# Patient Record
Sex: FEMALE | Race: WHITE | HISPANIC OR LATINO | URBAN - METROPOLITAN AREA
[De-identification: names, ages, dates, MRNs, and addresses within clinical notes are randomized per-mention and may not be internally consistent; named-entity substitution may affect disease eponyms.]

---

## 2024-04-04 ENCOUNTER — OV NP (OUTPATIENT)
Dept: URBAN - METROPOLITAN AREA CLINIC 7 | Facility: CLINIC | Age: 54
End: 2024-04-04

## 2024-04-25 ENCOUNTER — LAB (OUTPATIENT)
Dept: URBAN - METROPOLITAN AREA SURGERY CENTER 5 | Facility: SURGERY CENTER | Age: 54
End: 2024-04-25

## 2024-04-29 ENCOUNTER — DAC (OUTPATIENT)
Dept: URBAN - METROPOLITAN AREA SURGERY CENTER 5 | Facility: SURGERY CENTER | Age: 54
End: 2024-04-29
Payer: COMMERCIAL

## 2024-04-29 DIAGNOSIS — Z12.11 ENCOUNTER FOR SCREENING FOR MALIGNANT NEOPLASM OF COLON: ICD-10-CM

## 2024-04-29 DIAGNOSIS — K64.8 OTHER HEMORRHOIDS: ICD-10-CM

## 2024-04-29 DIAGNOSIS — K63.5 POLYP OF ASCENDING COLON, UNSPECIFIED TYPE: ICD-10-CM

## 2024-04-29 DIAGNOSIS — K63.89 OTHER SPECIFIED DISEASES OF INTESTINE: ICD-10-CM

## 2024-04-29 PROCEDURE — 45385 COLONOSCOPY W/LESION REMOVAL: CPT | Performed by: INTERNAL MEDICINE

## 2025-01-23 ENCOUNTER — DASHBOARD ENCOUNTERS (OUTPATIENT)
Age: 55
End: 2025-01-23

## 2025-01-23 ENCOUNTER — OFFICE VISIT (OUTPATIENT)
Dept: URBAN - METROPOLITAN AREA CLINIC 7 | Facility: CLINIC | Age: 55
End: 2025-01-23
Payer: COMMERCIAL

## 2025-01-23 VITALS
BODY MASS INDEX: 25.71 KG/M2 | RESPIRATION RATE: 16 BRPM | HEIGHT: 66 IN | HEART RATE: 71 BPM | SYSTOLIC BLOOD PRESSURE: 120 MMHG | DIASTOLIC BLOOD PRESSURE: 70 MMHG | TEMPERATURE: 97.6 F | WEIGHT: 160 LBS

## 2025-01-23 DIAGNOSIS — Z86.0100 PERSONAL HISTORY OF COLON POLYPS, UNSPECIFIED: ICD-10-CM

## 2025-01-23 DIAGNOSIS — R10.2 PELVIC PAIN IN FEMALE: ICD-10-CM

## 2025-01-23 DIAGNOSIS — K59.04 CHRONIC IDIOPATHIC CONSTIPATION: ICD-10-CM

## 2025-01-23 DIAGNOSIS — Z90.710 H/O HYSTERECTOMY FOR BENIGN DISEASE: ICD-10-CM

## 2025-01-23 PROCEDURE — 99214 OFFICE O/P EST MOD 30 MIN: CPT | Performed by: INTERNAL MEDICINE

## 2025-01-23 RX ORDER — MELOXICAM 15 MG/1
TABLET ORAL
Qty: 30 TABLET | Status: DISCONTINUED | COMMUNITY

## 2025-01-23 RX ORDER — LUBIPROSTONE 8 UG/1
1 CAPSULE WITH FOOD AND WATER CAPSULE, GELATIN COATED ORAL TWICE A DAY
Qty: 60 | Refills: 11 | OUTPATIENT
Start: 2025-01-23 | End: 2026-01-18

## 2025-01-23 RX ORDER — CYCLOBENZAPRINE 10 MG/1
TABLET, FILM COATED ORAL
Qty: 30 TABLET | Status: DISCONTINUED | COMMUNITY

## 2025-01-23 RX ORDER — ETODOLAC 400 MG/1
TABLET, FILM COATED ORAL
Qty: 60 TABLET | Status: DISCONTINUED | COMMUNITY

## 2025-01-23 NOTE — HPI-TODAY'S VISIT:
LV 4/2024 was for screening colon. Colon 4/2024 with 1 small TA, melanosis, tics, int hems. FU now to re-establish care. She is having constipation issues, long-term issues. Causes her to have back pain. Has herniated discs. Has taken everything under the sun for laxatives. Was on benefiber/miralax, didnt work. Takes stimulant every 3 weeks, but otherwise has BM every 3-4 days, pebbly, hard. Low abd pain at times.

## 2025-05-28 ENCOUNTER — TELEPHONE ENCOUNTER (OUTPATIENT)
Dept: URBAN - METROPOLITAN AREA CLINIC 7 | Facility: CLINIC | Age: 55
End: 2025-05-28

## 2025-05-29 ENCOUNTER — OFFICE VISIT (OUTPATIENT)
Dept: URBAN - METROPOLITAN AREA CLINIC 7 | Facility: CLINIC | Age: 55
End: 2025-05-29

## 2025-05-29 RX ORDER — LUBIPROSTONE 8 UG/1
1 CAPSULE WITH FOOD AND WATER CAPSULE, GELATIN COATED ORAL TWICE A DAY
Qty: 60 | Refills: 11 | Status: ACTIVE | COMMUNITY
Start: 2025-01-23 | End: 2026-01-18

## 2025-05-29 NOTE — HPI-TODAY'S VISIT:
LV 1/2025. Colon 4/2024 with 1 small TA, melanosis, tics, int hems. She was having constipation issues, long-term issues. Causes her to have back pain. Has herniated discs. Has taken everything under the sun as far as laxatives. Was on benefiber/miralax, didnt work. Takes stimulant every 3 weeks, but otherwise has BM every 3-4 days, pebbly, hard. Low abd pain at times. Plan was more consistent bowel regimen with fiber, miralax, and add lubiprostone 8 mcg twice daily, and may need to titrate upwards. No alarm symptoms. No need for further testing at that point. FU now. Labs in 2025 with normal CBC, CMP, and recent CT urogram with IV contrast without any intra-abdominal pathology.

## 2025-06-23 ENCOUNTER — OFFICE VISIT (OUTPATIENT)
Dept: URBAN - METROPOLITAN AREA CLINIC 7 | Facility: CLINIC | Age: 55
End: 2025-06-23
Payer: COMMERCIAL

## 2025-06-23 DIAGNOSIS — Z90.710 H/O HYSTERECTOMY FOR BENIGN DISEASE: ICD-10-CM

## 2025-06-23 DIAGNOSIS — K21.9 GASTROESOPHAGEAL REFLUX DISEASE, UNSPECIFIED WHETHER ESOPHAGITIS PRESENT: ICD-10-CM

## 2025-06-23 DIAGNOSIS — R14.0 BLOATING: ICD-10-CM

## 2025-06-23 DIAGNOSIS — K59.04 CHRONIC IDIOPATHIC CONSTIPATION: ICD-10-CM

## 2025-06-23 DIAGNOSIS — R10.2 PELVIC PAIN IN FEMALE: ICD-10-CM

## 2025-06-23 DIAGNOSIS — Z86.0100 PERSONAL HISTORY OF COLON POLYPS, UNSPECIFIED: ICD-10-CM

## 2025-06-23 DIAGNOSIS — R11.0 NAUSEA: ICD-10-CM

## 2025-06-23 PROBLEM — 235595009: Status: ACTIVE | Noted: 2025-06-23

## 2025-06-23 PROCEDURE — 99214 OFFICE O/P EST MOD 30 MIN: CPT

## 2025-06-23 RX ORDER — LUBIPROSTONE 8 UG/1
1 CAPSULE WITH FOOD AND WATER CAPSULE, GELATIN COATED ORAL TWICE A DAY
Qty: 60 | Refills: 11 | Status: DISCONTINUED | COMMUNITY
Start: 2025-01-23 | End: 2026-01-18

## 2025-06-23 RX ORDER — LUBIPROSTONE 24 UG/1
1 CAPSULE WITH FOOD AND WATER CAPSULE, GELATIN COATED ORAL TWICE A DAY
Qty: 60 | OUTPATIENT
Start: 2025-06-23 | End: 2025-07-23

## 2025-06-23 NOTE — HPI-TODAY'S VISIT:
Patient is a 54-year-old female presents for follow-up of chronic constipation. She reports ongoing symptoms despite previous trial of lubiprostone, which she used for 2 weeks but discontinued due to worsening of constipation and lack of benefit. She continues to experience a sense of incomplete evacuation, bloating, and heaviness, along with subjective weight gain. Uses OTC laxatives intermittently for relief. Denies rectal bleeding. Also reports associated upper GI symptoms including nausea, epigastric discomfort, acid reflux, frequent burping, and indigestion over the past few months. Symptoms are exacerbated by orange juice. She takes Pepcid as needed, which provides partial relief. Denies early satiety, dysphagia, odynophagia, or unexplained weight loss.  LV 1/2025 by Dr. Beasley. Colon 4/2024 with 1 small TA, melanosis, tics, int hems. She was having constipation issues, long-term issues. Causes her to have back pain. Has herniated discs. Has taken everything under the sun as far as laxatives. Was on benefiber/miralax, didnt work. Takes stimulant every 3 weeks, but otherwise has BM every 3-4 days, pebbly, hard. Low abd pain at times. Plan was more consistent bowel regimen with fiber, miralax, and add lubiprostone 8 mcg twice daily, and may need to titrate upwards. No alarm symptoms. No need for further testing at that point. Outside labs in 5/2025 with normal CBC, CMP, and recent CT urogram with IV contrast without any intra-abdominal pathology. FU now.

## 2025-07-07 ENCOUNTER — TELEPHONE ENCOUNTER (OUTPATIENT)
Dept: URBAN - METROPOLITAN AREA CLINIC 7 | Facility: CLINIC | Age: 55
End: 2025-07-07

## 2025-07-21 ENCOUNTER — ERX REFILL RESPONSE (OUTPATIENT)
Dept: URBAN - METROPOLITAN AREA CLINIC 7 | Facility: CLINIC | Age: 55
End: 2025-07-21

## 2025-07-21 RX ORDER — LUBIPROSTONE 24 UG/1
1 CAPSULE WITH FOOD AND WATER ORALLY TWICE A DAY CAPSULE, GELATIN COATED ORAL
Qty: 60 CAPSULE | Refills: 0